# Patient Record
Sex: FEMALE | Race: BLACK OR AFRICAN AMERICAN | HISPANIC OR LATINO | URBAN - METROPOLITAN AREA
[De-identification: names, ages, dates, MRNs, and addresses within clinical notes are randomized per-mention and may not be internally consistent; named-entity substitution may affect disease eponyms.]

---

## 2018-04-12 ENCOUNTER — APPOINTMENT (RX ONLY)
Dept: URBAN - METROPOLITAN AREA CLINIC 23 | Facility: CLINIC | Age: 33
Setting detail: DERMATOLOGY
End: 2018-04-12

## 2018-04-12 DIAGNOSIS — L81.1 CHLOASMA: ICD-10-CM

## 2018-04-12 DIAGNOSIS — L70.0 ACNE VULGARIS: ICD-10-CM

## 2018-04-12 PROCEDURE — ? ADDITIONAL NOTES

## 2018-04-12 PROCEDURE — 99213 OFFICE O/P EST LOW 20 MIN: CPT

## 2018-04-12 PROCEDURE — ? RECOMMENDATIONS

## 2018-04-12 PROCEDURE — ? IN-HOUSE DISPENSING PHARMACY

## 2018-04-12 PROCEDURE — ? COUNSELING

## 2018-04-12 PROCEDURE — ? PRESCRIPTION

## 2018-04-12 RX ORDER — CLINDAMYCIN PHOSPHATE 1 %
GEL (GRAM) TOPICAL QD
Qty: 30 | Refills: 1 | Status: ERX | COMMUNITY
Start: 2018-04-12

## 2018-04-12 RX ADMIN — Medication 1: at 00:00

## 2018-04-12 NOTE — PROCEDURE: COUNSELING
Detail Level: Zone
Bactrim Pregnancy And Lactation Text: This medication is Pregnancy Category D and is known to cause fetal risk. It is also excreted in breast milk.
Dapsone Counseling: I discussed with the patient the risks of dapsone including but not limited to hemolytic anemia, agranulocytosis, rashes, methemoglobinemia, kidney failure, peripheral neuropathy, headaches, GI upset, and liver toxicity. Patients who start dapsone require monitoring including baseline LFTs and weekly CBCs for the first month, then every month thereafter. The patient verbalized understanding of the proper use and possible adverse effects of dapsone. All of the patient's questions and concerns were addressed.
Dapsone Pregnancy And Lactation Text: This medication is Pregnancy Category C and is not considered safe during pregnancy or breast feeding.
Topical Retinoid Pregnancy And Lactation Text: This medication is Pregnancy Category C. It is unknown if this medication is excreted in breast milk.
Tazorac Counseling:  Patient advised that medication is irritating and drying. Patient may need to apply sparingly and wash off after an hour before eventually leaving it on overnight. The patient verbalized understanding of the proper use and possible adverse effects of tazorac. All of the patient's questions and concerns were addressed.
Benzoyl Peroxide Pregnancy And Lactation Text: This medication is Pregnancy Category C. It is unknown if benzoyl peroxide is excreted in breast milk.
Erythromycin Counseling:  I discussed with the patient the risks of erythromycin including but not limited to GI upset, allergic reaction, drug rash, diarrhea, increase in liver enzymes, and yeast infections.
Topical Retinoid counseling:  Patient advised to apply a pea-sized amount only at bedtime and wait 30 minutes after washing their face before applying. If too drying, patient may add a non-comedogenic moisturizer. The patient verbalized understanding of the proper use and possible adverse effects of retinoids. All of the patient's questions and concerns were addressed.
Doxycycline Counseling:  Patient counseled regarding possible photosensitivity and increased risk for sunburn. Patient instructed to avoid sunlight, if possible. When exposed to sunlight, patients should wear protective clothing, sunglasses, and sunscreen. The patient was instructed to call the office immediately if the following severe adverse effects occur:  hearing changes, easy bruising/bleeding, severe headache, or vision changes. The patient verbalized understanding of the proper use and possible adverse effects of doxycycline. All of the patient's questions and concerns were addressed.
Isotretinoin Counseling: Patient should get monthly blood tests, not donate blood, not drive at night if vision affected, not share medication, and not undergo elective surgery for 6 months after tx completed. Side effects reviewed, pt to contact office should one occur.
Doxycycline Pregnancy And Lactation Text: This medication is Pregnancy Category D and not consider safe during pregnancy. It is also excreted in breast milk but is considered safe for shorter treatment courses.
Topical Sulfur Applications Counseling: Topical Sulfur Counseling: Patient counseled that this medication may cause skin irritation or allergic reactions. In the event of skin irritation, the patient was advised to reduce the amount of the drug applied or use it less frequently. The patient verbalized understanding of the proper use and possible adverse effects of topical sulfur application. All of the patient's questions and concerns were addressed.
Spironolactone Counseling: Patient advised regarding risks of diarrhea, abdominal pain, hyperkalemia, birth defects (for female patients), liver toxicity and renal toxicity. The patient may need blood work to monitor liver and kidney function and potassium levels while on therapy. The patient verbalized understanding of the proper use and possible adverse effects of spironolactone. All of the patient's questions and concerns were addressed.
Minocycline Counseling: Patient advised regarding possible photosensitivity and discoloration of the teeth, skin, lips, tongue and gums. Patient instructed to avoid sunlight, if possible. When exposed to sunlight, patients should wear protective clothing, sunglasses, and sunscreen. The patient was instructed to call the office immediately if the following severe adverse effects occur:  hearing changes, easy bruising/bleeding, severe headache, or vision changes. The patient verbalized understanding of the proper use and possible adverse effects of minocycline. All of the patient's questions and concerns were addressed.
Birth Control Pills Pregnancy And Lactation Text: This medication should be avoided if pregnant and for the first 30 days post-partum.
Tazorac Pregnancy And Lactation Text: This medication is not safe during pregnancy. It is unknown if this medication is excreted in breast milk.
Bactrim Counseling:  I discussed with the patient the risks of sulfa antibiotics including but not limited to GI upset, allergic reaction, drug rash, diarrhea, dizziness, photosensitivity, and yeast infections. Rarely, more serious reactions can occur including but not limited to aplastic anemia, agranulocytosis, methemoglobinemia, blood dyscrasias, liver or kidney failure, lung infiltrates or desquamative/blistering drug rashes.
High Dose Vitamin A Pregnancy And Lactation Text: High dose vitamin A therapy is contraindicated during pregnancy and breast feeding.
Minocycline Pregnancy And Lactation Text: This medication is Pregnancy Category D and not consider safe during pregnancy. It is also excreted in breast milk.
Birth Control Pills Counseling: Birth Control Pill Counseling: I discussed with the patient the potential side effects of OCPs including but not limited to increased risk of stroke, heart attack, thrombophlebitis, deep venous thrombosis, hepatic adenomas, breast changes, GI upset, headaches, and depression. The patient verbalized understanding of the proper use and possible adverse effects of OCPs. All of the patient's questions and concerns were addressed.
Isotretinoin Pregnancy And Lactation Text: This medication is Pregnancy Category X and is considered extremely dangerous during pregnancy. It is unknown if it is excreted in breast milk.
Erythromycin Pregnancy And Lactation Text: This medication is Pregnancy Category B and is considered safe during pregnancy. It is also excreted in breast milk.
Spironolactone Pregnancy And Lactation Text: This medication can cause feminization of the male fetus and should be avoided during pregnancy. The active metabolite is also found in breast milk.
Azithromycin Pregnancy And Lactation Text: This medication is considered safe during pregnancy and is also secreted in breast milk.
Benzoyl Peroxide Counseling: Patient counseled that medicine may cause skin irritation and bleach clothing. In the event of skin irritation, the patient was advised to reduce the amount of the drug applied or use it less frequently. The patient verbalized understanding of the proper use and possible adverse effects of benzoyl peroxide. All of the patient's questions and concerns were addressed.
Topical Clindamycin Counseling: Patient counseled that this medication may cause skin irritation or allergic reactions. In the event of skin irritation, the patient was advised to reduce the amount of the drug applied or use it less frequently. The patient verbalized understanding of the proper use and possible adverse effects of clindamycin. All of the patient's questions and concerns were addressed.
Tetracycline Counseling: Patient counseled regarding possible photosensitivity and increased risk for sunburn. Patient instructed to avoid sunlight, if possible. When exposed to sunlight, patients should wear protective clothing, sunglasses, and sunscreen. The patient was instructed to call the office immediately if the following severe adverse effects occur:  hearing changes, easy bruising/bleeding, severe headache, or vision changes. The patient verbalized understanding of the proper use and possible adverse effects of tetracycline. All of the patient's questions and concerns were addressed. Patient understands to avoid pregnancy while on therapy due to potential birth defects.
Use Enhanced Medication Counseling?: No
Topical Clindamycin Pregnancy And Lactation Text: This medication is Pregnancy Category B and is considered safe during pregnancy. It is unknown if it is excreted in breast milk.
High Dose Vitamin A Counseling: Side effects reviewed, pt to contact office should one occur.
Topical Sulfur Applications Pregnancy And Lactation Text: This medication is Pregnancy Category C and has an unknown safety profile during pregnancy. It is unknown if this topical medication is excreted in breast milk.
Azithromycin Counseling:  I discussed with the patient the risks of azithromycin including but not limited to GI upset, allergic reaction, drug rash, diarrhea, and yeast infections.

## 2018-04-12 NOTE — PROCEDURE: IN-HOUSE DISPENSING PHARMACY
Product 53 Price/Unit (In Dollars): 0
Product 71 Unit Type: mg
Name Of Product 2: Trilumma cream
Product 1 Application Directions: BID
Product 2 Unit Type: grams
Product 2 Amount/Unit (Numbers Only): [address] [address]
Product 1 Price/Unit (In Dollars): 0.00
Product 1 Unit Type: jar(s)
Product 2 Application Directions: QHS
Render Refills If Set To 0: Yes
Name Of Product 1: Brightening pads with 6% HQ
Product 1 Amount/Unit (Numbers Only): [address] [facility]
Detail Level: Zone
Product 2 Units Dispensed: 1

## 2018-04-12 NOTE — PROCEDURE: RECOMMENDATIONS
Detail Level: Zone
Recommendations (Free Text): Vitalize peel, pt quoted$175/peel; Ematrix, pt quoted $1,500 (3tx)
Recommendation Preamble: Recommended

## 2018-04-30 ENCOUNTER — APPOINTMENT (RX ONLY)
Dept: URBAN - METROPOLITAN AREA CLINIC 23 | Facility: CLINIC | Age: 33
Setting detail: DERMATOLOGY
End: 2018-04-30

## 2018-04-30 DIAGNOSIS — Z41.9 ENCOUNTER FOR PROCEDURE FOR PURPOSES OTHER THAN REMEDYING HEALTH STATE, UNSPECIFIED: ICD-10-CM

## 2018-04-30 PROCEDURE — ? CHEMICAL PEEL

## 2018-04-30 NOTE — PROCEDURE: CHEMICAL PEEL
Price (Use Numbers Only, No Special Characters Or $): 0.00
Detail Level: Zone
Prep: The treated area was degreased with pre-peel cleanser, and vaseline was applied for protection of mucous membranes.
Post-Care Instructions: I reviewed with the patient in detail post-care instructions. Patient should avoid sun exposure and wear sun protection.
Comments: Vitalize peel Lot [id number] 1/2020
Chemical Peel: Skin Medica Vitalize
Consent: Prior to the procedure, written consent was obtained and risks were reviewed, including but not limited to: redness, peeling, blistering, pigmentary change, scarring, infection, and pain.
Post Peel Care: After the procedure, a post-peel cream was applied to the treated areas. Sun protection and post-care instructions were reviewed with the patient.
Treatment Number: 1
Number Of Layers: 3

## 2018-04-30 NOTE — HPI: COSMETIC (CHEMICAL PEEL)
Have You Had A Chemical Peel Before?: has not had a previous peel
Additional History: Denies any Hx of cold sores

## 2018-07-12 ENCOUNTER — APPOINTMENT (RX ONLY)
Dept: URBAN - METROPOLITAN AREA CLINIC 23 | Facility: CLINIC | Age: 33
Setting detail: DERMATOLOGY
End: 2018-07-12

## 2018-07-12 DIAGNOSIS — Z41.9 ENCOUNTER FOR PROCEDURE FOR PURPOSES OTHER THAN REMEDYING HEALTH STATE, UNSPECIFIED: ICD-10-CM

## 2018-07-12 PROCEDURE — ? CHEMICAL PEEL

## 2018-07-12 ASSESSMENT — LOCATION DETAILED DESCRIPTION DERM: LOCATION DETAILED: LEFT INFERIOR CENTRAL MALAR CHEEK

## 2018-07-12 ASSESSMENT — LOCATION ZONE DERM: LOCATION ZONE: FACE

## 2018-07-12 ASSESSMENT — LOCATION SIMPLE DESCRIPTION DERM: LOCATION SIMPLE: LEFT CHEEK

## 2018-07-12 NOTE — PROCEDURE: CHEMICAL PEEL
Number Of Layers: 3
Post Peel Care: After the procedure, a post-peel cream was applied to the treated areas. Sun protection and post-care instructions were reviewed with the patient.
Chemical Peel: Vi
Comments: 5 passes of retinoic acid performed\\nPeel lot# [id number] exp 3/20
Consent: Prior to the procedure, written consent was obtained and risks were reviewed, including but not limited to: redness, peeling, blistering, pigmentary change, scarring, infection, and pain.
Booster Applied In Office?: yes
Treatment Number: 2
Detail Level: Zone
Prep: The treated area was degreased with pre-peel cleanser, and vaseline was applied for protection of mucous membranes.
Post-Care Instructions: I reviewed with the patient in detail post-care instructions. Patient should avoid sun exposure and wear sun protection.

## 2020-08-14 ENCOUNTER — APPOINTMENT (RX ONLY)
Dept: URBAN - METROPOLITAN AREA CLINIC 23 | Facility: CLINIC | Age: 35
Setting detail: DERMATOLOGY
End: 2020-08-14

## 2020-08-14 VITALS — TEMPERATURE: 97.7 F

## 2020-08-14 DIAGNOSIS — L70.0 ACNE VULGARIS: ICD-10-CM

## 2020-08-14 DIAGNOSIS — L81.1 CHLOASMA: ICD-10-CM

## 2020-08-14 PROCEDURE — ? COUNSELING

## 2020-08-14 PROCEDURE — 99213 OFFICE O/P EST LOW 20 MIN: CPT

## 2020-08-14 PROCEDURE — ? RECOMMENDATIONS

## 2020-08-14 PROCEDURE — ? IN-HOUSE DISPENSING PHARMACY

## 2020-08-14 PROCEDURE — ? PRESCRIPTION

## 2020-08-14 RX ORDER — TRETINOIN 0.5 MG/G
LOTION TOPICAL
Qty: 1 | Refills: 3 | Status: ERX | COMMUNITY
Start: 2020-08-14

## 2020-08-14 RX ADMIN — TRETINOIN 1: 0.5 LOTION TOPICAL at 00:00

## 2020-08-14 ASSESSMENT — LOCATION ZONE DERM: LOCATION ZONE: FACE

## 2020-08-14 ASSESSMENT — LOCATION DETAILED DESCRIPTION DERM: LOCATION DETAILED: RIGHT CENTRAL MALAR CHEEK

## 2020-08-14 ASSESSMENT — LOCATION SIMPLE DESCRIPTION DERM: LOCATION SIMPLE: RIGHT CHEEK

## 2020-08-14 NOTE — PROCEDURE: IN-HOUSE DISPENSING PHARMACY
Product 48 Price/Unit (In Dollars): 0
Name Of Product 15: Viviscal PRO
Product 30 Unit Type: mg
Product 9 Application Directions: Apply to affected area face am only as indicated.
Detail Level: Detailed
Product 13 Amount/Unit (Numbers Only): 1
Product 1 Amount/Unit (Numbers Only): 5
Product 6 Application Directions: Apply pea size amount to entire face at bedtime only.
Product 3 Unit Type: tablets
Name Of Product 17: Kelly Mishra
Product 9 Unit Type: jar(s)
Product 12 Application Directions: Apply to the entire face in the Am and Pm
Name Of Product 4: Prednisone 20mg
Product 6 Unit Type: grams
Product 19 Unit Type: tube(s)
Product 15 Application Directions: Take one tablet twice daily for 3-6 months
Name Of Product 10: Valium
Product 12 Unit Type: bottle(s)
Name Of Product 7: Latisse 5ml
Name Of Product 20: Apply pea size amount to entire face at bedtime only at bedtime only.
Product 11 Application Directions: Apply to the face once at night.
Product 8 Application Directions: Take one tablet today one hour prior to procedure as indicated.
Name Of Product 3: Prednisone 10 mg
Product 14 Application Directions: Take one tablet in the Am and Pm x 3 days
Product 21 Unit Type: kit(s)
Name Of Product 6: Tretinoin 0.05% cream
Product 13 Refills: 2
Name Of Product 9: Brightening pads 8%
Name Of Product 12: Skin Better Even tone Serum
Product 3 Application Directions: Take 1 tablet-today and one tablets tomorrow as indicated for swelling.
Product 19 Application Directions: Tretinoin 0.05%
Product 1 Price/Unit (In Dollars): 0.00
Name Of Product 21: Defenage skincare
Name Of Product 8: Valium 5 mg
Name Of Product 11: Hydroquinone 6% pads
Product 2 Application Directions: Apply thin layer at bedtime only as indicated.
Product 6 Amount/Unit (Numbers Only): [address] [facility], [place]
Product 1 Unit Type: ml
Send Charges To Patient Encounter: No
Name Of Product 14: Valtrex 500mg
Product 5 Application Directions: Take one tablet by mouth tomorrow as indicated for swelling
Product 15 Amount/Unit (Numbers Only): 26[address] [facility]
Product 21 Application Directions: Use as directed
Product 17 Application Directions: Apply thin layer to face at bedtime only.
Name Of Product 13: Exfoliate Glycolic Cleanser
Name Of Product 2: Brandon Wolf
Product 4 Application Directions: Take 1 tablet today in office post injections  for swelling as indicated and one tablet tomorrow as needed for swelling.
Product 14 Amount/Unit (Numbers Only): [address]
Product 10 Application Directions: Take two tablets with water prior to procedure.
Product 7 Application Directions: Apply to each eye lashes at bedtime
Product 1 Application Directions: Apply to eyelids at bedtime as indicated.
Name Of Product 1: Latiesse 5 ml
Product 13 Application Directions: Wash the face in the AM and PM

## 2020-08-14 NOTE — PROCEDURE: COUNSELING
Detail Level: Simple
Sarecycline Pregnancy And Lactation Text: This medication is Pregnancy Category D and not consider safe during pregnancy. It is also excreted in breast milk.
Bactrim Pregnancy And Lactation Text: This medication is Pregnancy Category D and is known to cause fetal risk. It is also excreted in breast milk.
Benzoyl Peroxide Pregnancy And Lactation Text: This medication is Pregnancy Category C. It is unknown if benzoyl peroxide is excreted in breast milk.
Birth Control Pills Pregnancy And Lactation Text: This medication should be avoided if pregnant and for the first 30 days post-partum.
High Dose Vitamin A Pregnancy And Lactation Text: High dose vitamin A therapy is contraindicated during pregnancy and breast feeding.
Dapsone Pregnancy And Lactation Text: This medication is Pregnancy Category C and is not considered safe during pregnancy or breast feeding.
Spironolactone Pregnancy And Lactation Text: This medication can cause feminization of the male fetus and should be avoided during pregnancy. The active metabolite is also found in breast milk.
Tazorac Pregnancy And Lactation Text: This medication is not safe during pregnancy. It is unknown if this medication is excreted in breast milk.
Topical Retinoid Pregnancy And Lactation Text: This medication is Pregnancy Category C. It is unknown if this medication is excreted in breast milk.
Azithromycin Pregnancy And Lactation Text: This medication is considered safe during pregnancy and is also secreted in breast milk.
Isotretinoin Pregnancy And Lactation Text: This medication is Pregnancy Category X and is considered extremely dangerous during pregnancy. It is unknown if it is excreted in breast milk.
Topical Retinoid counseling:  Patient advised to apply a pea-sized amount only at bedtime and wait 30 minutes after washing their face before applying. If too drying, patient may add a non-comedogenic moisturizer. The patient verbalized understanding of the proper use and possible adverse effects of retinoids. All of the patient's questions and concerns were addressed.
Doxycycline Counseling:  Patient counseled regarding possible photosensitivity and increased risk for sunburn. Patient instructed to avoid sunlight, if possible. When exposed to sunlight, patients should wear protective clothing, sunglasses, and sunscreen. The patient was instructed to call the office immediately if the following severe adverse effects occur:  hearing changes, easy bruising/bleeding, severe headache, or vision changes. The patient verbalized understanding of the proper use and possible adverse effects of doxycycline. All of the patient's questions and concerns were addressed.
Erythromycin Counseling:  I discussed with the patient the risks of erythromycin including but not limited to GI upset, allergic reaction, drug rash, diarrhea, increase in liver enzymes, and yeast infections.
Include Pregnancy/Lactation Warning?: No
Topical Sulfur Applications Counseling: Topical Sulfur Counseling: Patient counseled that this medication may cause skin irritation or allergic reactions. In the event of skin irritation, the patient was advised to reduce the amount of the drug applied or use it less frequently. The patient verbalized understanding of the proper use and possible adverse effects of topical sulfur application. All of the patient's questions and concerns were addressed.
Isotretinoin Counseling: Patient should get monthly blood tests, not donate blood, not drive at night if vision affected, not share medication, and not undergo elective surgery for 6 months after tx completed. Side effects reviewed, pt to contact office should one occur.
Bactrim Counseling:  I discussed with the patient the risks of sulfa antibiotics including but not limited to GI upset, allergic reaction, drug rash, diarrhea, dizziness, photosensitivity, and yeast infections. Rarely, more serious reactions can occur including but not limited to aplastic anemia, agranulocytosis, methemoglobinemia, blood dyscrasias, liver or kidney failure, lung infiltrates or desquamative/blistering drug rashes.
Birth Control Pills Counseling: Birth Control Pill Counseling: I discussed with the patient the potential side effects of OCPs including but not limited to increased risk of stroke, heart attack, thrombophlebitis, deep venous thrombosis, hepatic adenomas, breast changes, GI upset, headaches, and depression. The patient verbalized understanding of the proper use and possible adverse effects of OCPs. All of the patient's questions and concerns were addressed.
Sarecycline Counseling: Patient advised regarding possible photosensitivity and discoloration of the teeth, skin, lips, tongue and gums. Patient instructed to avoid sunlight, if possible. When exposed to sunlight, patients should wear protective clothing, sunglasses, and sunscreen. The patient was instructed to call the office immediately if the following severe adverse effects occur:  hearing changes, easy bruising/bleeding, severe headache, or vision changes. The patient verbalized understanding of the proper use and possible adverse effects of sarecycline. All of the patient's questions and concerns were addressed.
Minocycline Counseling: Patient advised regarding possible photosensitivity and discoloration of the teeth, skin, lips, tongue and gums. Patient instructed to avoid sunlight, if possible. When exposed to sunlight, patients should wear protective clothing, sunglasses, and sunscreen. The patient was instructed to call the office immediately if the following severe adverse effects occur:  hearing changes, easy bruising/bleeding, severe headache, or vision changes. The patient verbalized understanding of the proper use and possible adverse effects of minocycline. All of the patient's questions and concerns were addressed.
Benzoyl Peroxide Counseling: Patient counseled that medicine may cause skin irritation and bleach clothing. In the event of skin irritation, the patient was advised to reduce the amount of the drug applied or use it less frequently. The patient verbalized understanding of the proper use and possible adverse effects of benzoyl peroxide. All of the patient's questions and concerns were addressed.
Topical Clindamycin Counseling: Patient counseled that this medication may cause skin irritation or allergic reactions. In the event of skin irritation, the patient was advised to reduce the amount of the drug applied or use it less frequently. The patient verbalized understanding of the proper use and possible adverse effects of clindamycin. All of the patient's questions and concerns were addressed.
Tetracycline Counseling: Patient counseled regarding possible photosensitivity and increased risk for sunburn. Patient instructed to avoid sunlight, if possible. When exposed to sunlight, patients should wear protective clothing, sunglasses, and sunscreen. The patient was instructed to call the office immediately if the following severe adverse effects occur:  hearing changes, easy bruising/bleeding, severe headache, or vision changes. The patient verbalized understanding of the proper use and possible adverse effects of tetracycline. All of the patient's questions and concerns were addressed. Patient understands to avoid pregnancy while on therapy due to potential birth defects.
Dapsone Counseling: I discussed with the patient the risks of dapsone including but not limited to hemolytic anemia, agranulocytosis, rashes, methemoglobinemia, kidney failure, peripheral neuropathy, headaches, GI upset, and liver toxicity. Patients who start dapsone require monitoring including baseline LFTs and weekly CBCs for the first month, then every month thereafter. The patient verbalized understanding of the proper use and possible adverse effects of dapsone. All of the patient's questions and concerns were addressed.
High Dose Vitamin A Counseling: Side effects reviewed, pt to contact office should one occur.
Spironolactone Counseling: Patient advised regarding risks of diarrhea, abdominal pain, hyperkalemia, birth defects (for female patients), liver toxicity and renal toxicity. The patient may need blood work to monitor liver and kidney function and potassium levels while on therapy. The patient verbalized understanding of the proper use and possible adverse effects of spironolactone. All of the patient's questions and concerns were addressed.
Tazorac Counseling:  Patient advised that medication is irritating and drying. Patient may need to apply sparingly and wash off after an hour before eventually leaving it on overnight. The patient verbalized understanding of the proper use and possible adverse effects of tazorac. All of the patient's questions and concerns were addressed.
Azithromycin Counseling:  I discussed with the patient the risks of azithromycin including but not limited to GI upset, allergic reaction, drug rash, diarrhea, and yeast infections.
Topical Sulfur Applications Pregnancy And Lactation Text: This medication is Pregnancy Category C and has an unknown safety profile during pregnancy. It is unknown if this topical medication is excreted in breast milk.
Doxycycline Pregnancy And Lactation Text: This medication is Pregnancy Category D and not consider safe during pregnancy. It is also excreted in breast milk but is considered safe for shorter treatment courses.
Topical Clindamycin Pregnancy And Lactation Text: This medication is Pregnancy Category B and is considered safe during pregnancy. It is unknown if it is excreted in breast milk.
Erythromycin Pregnancy And Lactation Text: This medication is Pregnancy Category B and is considered safe during pregnancy. It is also excreted in breast milk.

## 2020-08-14 NOTE — PROCEDURE: RECOMMENDATIONS
Recommendation Preamble: The following recommendations were made during the visit: patient to monitor for any abnormalities and to follow up in 2 months
Recommendations (Free Text): Hydroquinone 8% pads in AM daily \\nAltreno Lotion at bedtime daily \\nTNS advanced + serum in AM & PM \\nPicoway Laser $350 spots or $700 full face\\nRevision teamine eye cream
Detail Level: Detailed

## 2020-10-16 ENCOUNTER — APPOINTMENT (RX ONLY)
Dept: URBAN - METROPOLITAN AREA CLINIC 23 | Facility: CLINIC | Age: 35
Setting detail: DERMATOLOGY
End: 2020-10-16

## 2020-10-16 VITALS — TEMPERATURE: 97.3 F

## 2020-10-16 DIAGNOSIS — L81.1 CHLOASMA: ICD-10-CM

## 2020-10-16 PROCEDURE — ? COUNSELING

## 2020-10-16 PROCEDURE — ? RECOMMENDATIONS

## 2020-10-16 PROCEDURE — 99213 OFFICE O/P EST LOW 20 MIN: CPT

## 2020-10-16 NOTE — PROCEDURE: RECOMMENDATIONS
Detail Level: Detailed
Recommendations (Free Text): Discussed Hydrofacials, E-matrix x 3 treatments- Quoted $1,500 vs. Picoway 3 treatments, start C & E Ferrulic, finish brightening pads, continue Altreno.
Recommendation Preamble: The following recommendations were made during the visit: patient to monitor for any abnormalities and to follow up in 2 months

## 2020-10-16 NOTE — HPI: DISCOLORATION (MELASMA)
How Severe Are They?: moderate
Is This A New Presentation, Or A Follow-Up?: Follow Up Melasma
Additional History: Patient on brightening pads 8% TNS advanced serum and altreno

## 2021-05-14 ENCOUNTER — APPOINTMENT (RX ONLY)
Dept: URBAN - METROPOLITAN AREA CLINIC 23 | Facility: CLINIC | Age: 36
Setting detail: DERMATOLOGY
End: 2021-05-14

## 2021-05-14 VITALS — TEMPERATURE: 97.5 F

## 2021-05-14 DIAGNOSIS — D22 MELANOCYTIC NEVI: ICD-10-CM

## 2021-05-14 DIAGNOSIS — L81.1 CHLOASMA: ICD-10-CM

## 2021-05-14 DIAGNOSIS — Z41.9 ENCOUNTER FOR PROCEDURE FOR PURPOSES OTHER THAN REMEDYING HEALTH STATE, UNSPECIFIED: ICD-10-CM

## 2021-05-14 DIAGNOSIS — L70.0 ACNE VULGARIS: ICD-10-CM

## 2021-05-14 PROBLEM — D22.5 MELANOCYTIC NEVI OF TRUNK: Status: ACTIVE | Noted: 2021-05-14

## 2021-05-14 PROCEDURE — 99213 OFFICE O/P EST LOW 20 MIN: CPT

## 2021-05-14 PROCEDURE — ? IN-HOUSE DISPENSING PHARMACY

## 2021-05-14 PROCEDURE — ? RECOMMENDATIONS

## 2021-05-14 PROCEDURE — ? COUNSELING

## 2021-05-14 ASSESSMENT — LOCATION SIMPLE DESCRIPTION DERM
LOCATION SIMPLE: CHEST
LOCATION SIMPLE: LEFT UPPER BACK

## 2021-05-14 ASSESSMENT — LOCATION DETAILED DESCRIPTION DERM
LOCATION DETAILED: UPPER STERNUM
LOCATION DETAILED: LEFT SUPERIOR MEDIAL UPPER BACK

## 2021-05-14 ASSESSMENT — LOCATION ZONE DERM: LOCATION ZONE: TRUNK

## 2021-05-14 NOTE — PROCEDURE: RECOMMENDATIONS
Render Risk Assessment In Note?: no
Recommendations (Free Text): Acne clearing toner pads
Detail Level: Simple
Recommendations (Free Text): Cyspera/brightening pads 10 %/Tri-storm cream
Recommendations (Free Text): Chemical peel/Fillers and botox\\nAm: Cyspera,CE Ferulic, Sunscreen\\nPm: brightening pads 10% and alternate with Altreno, followed by moisturizer

## 2021-05-14 NOTE — PROCEDURE: IN-HOUSE DISPENSING PHARMACY
Product 75 Refills: 0
Product 11 Application Directions: Apply to the face once at night.
Name Of Product 13: Exfoliate Glycolic Cleanser
Product 6 Refills: 1
Product 16 Unit Type: mg
Product 21 Unit Type: kit(s)
Product 7 Application Directions: Apply to each eye lashes at bedtime
Product 12 Unit Type: bottle(s)
Render Refills If Set To 0: No
Product 12 Refills: 2
Product 21 Application Directions: Use as directed
Name Of Product 11: Hydroquinone 6% pads
Product 12 Application Directions: Apply to the entire face in the Am and Pm
Name Of Product 4: Prednisone 20mg
Name Of Product 20: Apply pea size amount to entire face at bedtime only at bedtime only.
Name Of Product 15: Viviscal PRO
Name Of Product 12: Skin Better Even tone Serum
Product 1 Application Directions: Apply a pea size amount to neck area at bedtime only.
Product 13 Application Directions: Wash the face in the AM and PM
Product 19 Unit Type: tube(s)
Name Of Product 8: Valium 5 mg
Product 11 Unit Type: jar(s)
Product 6 Amount/Unit (Numbers Only): [address] [facility]
Name Of Product 17: Rogelio Berkowitz
Product 19 Application Directions: Tretinoin 0.05%
Name Of Product 1: Tiffany Aponte
Product 15 Amount/Unit (Numbers Only): [address] [facility]
Name Of Product 9: Brightening pads 10%
Product 5 Application Directions: Take one tablet by mouth before  procedure
Product 15 Application Directions: Take one tablet twice daily for 3-6 months
Product 8 Unit Type: tablets
Name Of Product 10: Bryan Quintero
Name Of Product 14: Valtrex 500mg
Name Of Product 2: prednisone 10mg
Product 8 Application Directions: Take 1 tablet prior to procedure. Dispense in office.
Product 17 Application Directions: Apply thin layer to face at bedtime only.
Name Of Product 6: Tretinoin 0.05% cream
Name Of Product 5: Valium 5mg
Name Of Product 7: Latisse 5ml
Product 9 Application Directions: Apply to affected area face pm as indicated
Detail Level: Detailed
Product 6 Unit Type: grams
Name Of Product 21: Defenage skincare
Product 2 Application Directions: Take one tablet today in office for swelling as indicated.
Product 10 Application Directions: Apply to face small amount at bedtime only as indicated
Product 14 Amount/Unit (Numbers Only): [address]
Product 1 Price/Unit (In Dollars): 0.00
Product 4 Application Directions: Take 1 tablet today in office post injections  for swelling as indicated and one tablet tomorrow as needed for swelling.
Product 6 Application Directions: Apply pea size amount to entire face at bedtime only.
Product 3 Application Directions: Take 1 tablet For swelling as indicated today in office
Product 14 Application Directions: Take one tablet prior to procedure. Continue twice daily x 3 days as indicated.

## 2021-05-14 NOTE — PROCEDURE: COUNSELING
Detail Level: Zone
Dapsone Pregnancy And Lactation Text: This medication is Pregnancy Category C and is not considered safe during pregnancy or breast feeding.
Topical Retinoid counseling:  Patient advised to apply a pea-sized amount only at bedtime and wait 30 minutes after washing their face before applying. If too drying, patient may add a non-comedogenic moisturizer. The patient verbalized understanding of the proper use and possible adverse effects of retinoids. All of the patient's questions and concerns were addressed.
Erythromycin Pregnancy And Lactation Text: This medication is Pregnancy Category B and is considered safe during pregnancy. It is also excreted in breast milk.
Azithromycin Counseling:  I discussed with the patient the risks of azithromycin including but not limited to GI upset, allergic reaction, drug rash, diarrhea, and yeast infections.
Topical Clindamycin Counseling: Patient counseled that this medication may cause skin irritation or allergic reactions. In the event of skin irritation, the patient was advised to reduce the amount of the drug applied or use it less frequently. The patient verbalized understanding of the proper use and possible adverse effects of clindamycin. All of the patient's questions and concerns were addressed.
High Dose Vitamin A Pregnancy And Lactation Text: High dose vitamin A therapy is contraindicated during pregnancy and breast feeding.
Topical Sulfur Applications Pregnancy And Lactation Text: This medication is Pregnancy Category C and has an unknown safety profile during pregnancy. It is unknown if this topical medication is excreted in breast milk.
Bactrim Pregnancy And Lactation Text: This medication is Pregnancy Category D and is known to cause fetal risk. It is also excreted in breast milk.
Tetracycline Counseling: Patient counseled regarding possible photosensitivity and increased risk for sunburn. Patient instructed to avoid sunlight, if possible. When exposed to sunlight, patients should wear protective clothing, sunglasses, and sunscreen. The patient was instructed to call the office immediately if the following severe adverse effects occur:  hearing changes, easy bruising/bleeding, severe headache, or vision changes. The patient verbalized understanding of the proper use and possible adverse effects of tetracycline. All of the patient's questions and concerns were addressed. Patient understands to avoid pregnancy while on therapy due to potential birth defects.
High Dose Vitamin A Counseling: Side effects reviewed, pt to contact office should one occur.
Sarecycline Counseling: Patient advised regarding possible photosensitivity and discoloration of the teeth, skin, lips, tongue and gums. Patient instructed to avoid sunlight, if possible. When exposed to sunlight, patients should wear protective clothing, sunglasses, and sunscreen. The patient was instructed to call the office immediately if the following severe adverse effects occur:  hearing changes, easy bruising/bleeding, severe headache, or vision changes. The patient verbalized understanding of the proper use and possible adverse effects of sarecycline. All of the patient's questions and concerns were addressed.
Topical Retinoid Pregnancy And Lactation Text: This medication is Pregnancy Category C. It is unknown if this medication is excreted in breast milk.
Isotretinoin Counseling: Patient should get monthly blood tests, not donate blood, not drive at night if vision affected, not share medication, and not undergo elective surgery for 6 months after tx completed. Side effects reviewed, pt to contact office should one occur.
Birth Control Pills Counseling: Birth Control Pill Counseling: I discussed with the patient the potential side effects of OCPs including but not limited to increased risk of stroke, heart attack, thrombophlebitis, deep venous thrombosis, hepatic adenomas, breast changes, GI upset, headaches, and depression. The patient verbalized understanding of the proper use and possible adverse effects of OCPs. All of the patient's questions and concerns were addressed.
Tetracycline Pregnancy And Lactation Text: This medication is Pregnancy Category D and not consider safe during pregnancy. It is also excreted in breast milk.
Doxycycline Counseling:  Patient counseled regarding possible photosensitivity and increased risk for sunburn. Patient instructed to avoid sunlight, if possible. When exposed to sunlight, patients should wear protective clothing, sunglasses, and sunscreen. The patient was instructed to call the office immediately if the following severe adverse effects occur:  hearing changes, easy bruising/bleeding, severe headache, or vision changes. The patient verbalized understanding of the proper use and possible adverse effects of doxycycline. All of the patient's questions and concerns were addressed.
Topical Clindamycin Pregnancy And Lactation Text: This medication is Pregnancy Category B and is considered safe during pregnancy. It is unknown if it is excreted in breast milk.
Doxycycline Pregnancy And Lactation Text: This medication is Pregnancy Category D and not consider safe during pregnancy. It is also excreted in breast milk but is considered safe for shorter treatment courses.
Tazorac Counseling:  Patient advised that medication is irritating and drying. Patient may need to apply sparingly and wash off after an hour before eventually leaving it on overnight. The patient verbalized understanding of the proper use and possible adverse effects of tazorac. All of the patient's questions and concerns were addressed.
Birth Control Pills Pregnancy And Lactation Text: This medication should be avoided if pregnant and for the first 30 days post-partum.
Isotretinoin Pregnancy And Lactation Text: This medication is Pregnancy Category X and is considered extremely dangerous during pregnancy. It is unknown if it is excreted in breast milk.
Spironolactone Counseling: Patient advised regarding risks of diarrhea, abdominal pain, hyperkalemia, birth defects (for female patients), liver toxicity and renal toxicity. The patient may need blood work to monitor liver and kidney function and potassium levels while on therapy. The patient verbalized understanding of the proper use and possible adverse effects of spironolactone. All of the patient's questions and concerns were addressed.
Benzoyl Peroxide Counseling: Patient counseled that medicine may cause skin irritation and bleach clothing. In the event of skin irritation, the patient was advised to reduce the amount of the drug applied or use it less frequently. The patient verbalized understanding of the proper use and possible adverse effects of benzoyl peroxide. All of the patient's questions and concerns were addressed.
Topical Sulfur Applications Counseling: Topical Sulfur Counseling: Patient counseled that this medication may cause skin irritation or allergic reactions. In the event of skin irritation, the patient was advised to reduce the amount of the drug applied or use it less frequently. The patient verbalized understanding of the proper use and possible adverse effects of topical sulfur application. All of the patient's questions and concerns were addressed.
Minocycline Counseling: Patient advised regarding possible photosensitivity and discoloration of the teeth, skin, lips, tongue and gums. Patient instructed to avoid sunlight, if possible. When exposed to sunlight, patients should wear protective clothing, sunglasses, and sunscreen. The patient was instructed to call the office immediately if the following severe adverse effects occur:  hearing changes, easy bruising/bleeding, severe headache, or vision changes. The patient verbalized understanding of the proper use and possible adverse effects of minocycline. All of the patient's questions and concerns were addressed.
Azithromycin Pregnancy And Lactation Text: This medication is considered safe during pregnancy and is also secreted in breast milk.
Erythromycin Counseling:  I discussed with the patient the risks of erythromycin including but not limited to GI upset, allergic reaction, drug rash, diarrhea, increase in liver enzymes, and yeast infections.
Tazorac Pregnancy And Lactation Text: This medication is not safe during pregnancy. It is unknown if this medication is excreted in breast milk.
Include Pregnancy/Lactation Warning?: No
Dapsone Counseling: I discussed with the patient the risks of dapsone including but not limited to hemolytic anemia, agranulocytosis, rashes, methemoglobinemia, kidney failure, peripheral neuropathy, headaches, GI upset, and liver toxicity. Patients who start dapsone require monitoring including baseline LFTs and weekly CBCs for the first month, then every month thereafter. The patient verbalized understanding of the proper use and possible adverse effects of dapsone. All of the patient's questions and concerns were addressed.
Benzoyl Peroxide Pregnancy And Lactation Text: This medication is Pregnancy Category C. It is unknown if benzoyl peroxide is excreted in breast milk.
Bactrim Counseling:  I discussed with the patient the risks of sulfa antibiotics including but not limited to GI upset, allergic reaction, drug rash, diarrhea, dizziness, photosensitivity, and yeast infections. Rarely, more serious reactions can occur including but not limited to aplastic anemia, agranulocytosis, methemoglobinemia, blood dyscrasias, liver or kidney failure, lung infiltrates or desquamative/blistering drug rashes.
Spironolactone Pregnancy And Lactation Text: This medication can cause feminization of the male fetus and should be avoided during pregnancy. The active metabolite is also found in breast milk.

## 2022-09-01 ENCOUNTER — APPOINTMENT (RX ONLY)
Dept: URBAN - METROPOLITAN AREA CLINIC 23 | Facility: CLINIC | Age: 37
Setting detail: DERMATOLOGY
End: 2022-09-01

## 2022-09-01 DIAGNOSIS — Z71.89 OTHER SPECIFIED COUNSELING: ICD-10-CM

## 2022-09-01 DIAGNOSIS — L81.1 CHLOASMA: ICD-10-CM

## 2022-09-01 DIAGNOSIS — L73.8 OTHER SPECIFIED FOLLICULAR DISORDERS: ICD-10-CM

## 2022-09-01 DIAGNOSIS — D22 MELANOCYTIC NEVI: ICD-10-CM

## 2022-09-01 DIAGNOSIS — L70.0 ACNE VULGARIS: ICD-10-CM

## 2022-09-01 PROBLEM — D23.61 OTHER BENIGN NEOPLASM OF SKIN OF RIGHT UPPER LIMB, INCLUDING SHOULDER: Status: ACTIVE | Noted: 2022-09-01

## 2022-09-01 PROBLEM — D22.5 MELANOCYTIC NEVI OF TRUNK: Status: ACTIVE | Noted: 2022-09-01

## 2022-09-01 PROCEDURE — ? SUNSCREEN RECOMMENDATIONS

## 2022-09-01 PROCEDURE — 11900 INJECT SKIN LESIONS </W 7: CPT | Mod: 59

## 2022-09-01 PROCEDURE — 99213 OFFICE O/P EST LOW 20 MIN: CPT | Mod: 25

## 2022-09-01 PROCEDURE — ? RECOMMENDATIONS

## 2022-09-01 PROCEDURE — 17110 DESTRUCTION B9 LES UP TO 14: CPT

## 2022-09-01 PROCEDURE — ? IN-HOUSE DISPENSING PHARMACY

## 2022-09-01 PROCEDURE — ? ELECTRODESICCATION

## 2022-09-01 PROCEDURE — ? INTRALESIONAL KENALOG

## 2022-09-01 PROCEDURE — ? COUNSELING

## 2022-09-01 ASSESSMENT — LOCATION SIMPLE DESCRIPTION DERM
LOCATION SIMPLE: LEFT FOREHEAD
LOCATION SIMPLE: LEFT UPPER BACK
LOCATION SIMPLE: LEFT CHEEK
LOCATION SIMPLE: INFERIOR FOREHEAD
LOCATION SIMPLE: RIGHT FOREHEAD

## 2022-09-01 ASSESSMENT — LOCATION DETAILED DESCRIPTION DERM
LOCATION DETAILED: LEFT INFERIOR CENTRAL MALAR CHEEK
LOCATION DETAILED: LEFT MEDIAL FOREHEAD
LOCATION DETAILED: RIGHT INFERIOR FOREHEAD
LOCATION DETAILED: LEFT MID-UPPER BACK
LOCATION DETAILED: LEFT SUPERIOR MEDIAL UPPER BACK
LOCATION DETAILED: RIGHT FOREHEAD
LOCATION DETAILED: INFERIOR MID FOREHEAD
LOCATION DETAILED: RIGHT INFERIOR LATERAL FOREHEAD
LOCATION DETAILED: LEFT INFERIOR LATERAL FOREHEAD

## 2022-09-01 ASSESSMENT — LOCATION ZONE DERM
LOCATION ZONE: TRUNK
LOCATION ZONE: FACE

## 2022-09-01 NOTE — PROCEDURE: ELECTRODESICCATION
Anesthesia Type: 1% lidocaine with epinephrine
Medical Necessity Information: It is in your best interest to select a reason for this procedure from the list below. All of these items fulfill various CMS LCD requirements except the new and changing color options.
Medical Necessity Clause: This procedure was medically necessary because the lesions that were treated were:
Post-Care Instructions: I reviewed with the patient in detail post-care instructions. Patient is to wear sunprotection, and avoid picking at any of the treated lesions.  Pt may apply Vaseline to crusted or scabbing areas
Detail Level: Detailed
Include Z78.9 (Other Specified Conditions Influencing Health Status) As An Associated Diagnosis?: No
Anesthesia Volume In Cc: 0.3
Consent: The patient's consent was obtained including but not limited to risks of crusting, scabbing, blistering, scarring, darker or lighter pigmentary change, recurrence, incomplete removal and infection.
Carrion: 4

## 2022-09-01 NOTE — PROCEDURE: IN-HOUSE DISPENSING PHARMACY
Product 59 Unit Type: mg
Product 46 Price/Unit (In Dollars): 0
Name Of Product 22: Calcipotriene/ 5 FLuoracil cream
Name Of Product 3: Hydroquinone brightening pads 10%
Product 4 Amount/Unit (Numbers Only): 1
Product 6 Unit Type: grams
Product 2 Refills: 3
Product 19 Unit Type: bottle(s)
Name Of Product 14: Valtrex 500mg
Name Of Product 4: Gregory Norris
Detail Level: Detailed
Product 11 Amount/Unit (Numbers Only): [address] [facility] [place]
Product 10 Application Directions: Take  1 tablet
Name Of Product 7: Clearnce Flor
Name Of Product 20: Krzysztof Moser
Product 13 Price/Unit (In Dollars): $10.00
Product 16 Application Directions: Apply to lashes at bedtime only
Send Charges To Patient Encounter: No
Product 22 Application Directions: Apply small amount to temple and forehead twice daily as indicated x 3 or 5 days
Name Of Product 12: Skin Better Even tone Serum
Product 21 Application Directions: Use as directed
Product 5 Application Directions: Apply 1 drop to both Eyelashes at bedtime only
Product 1 Unit Type: jar(s)
Name Of Product 18: Mark Levin
Product 13 Application Directions: Apply to wound site every 48 hours as indicated.
Product 15 Unit Type: ml
Name Of Product 2: Tretinoin
Product 14 Amount/Unit (Numbers Only): [address]
Product 21 Unit Type: kit(s)
Name Of Product 10: Prednisone 10mg
Product 6 Application Directions: Apply pea size amount to the face at bedtime only.
Product 2 Unit Type: tube(s)
Name Of Product 16: Latisse
Product 12 Refills: 2
Product 13 Unit Type: unit(s)
Product 2 Price/Unit (In Dollars): 75
Product 12 Application Directions: Apply to the entire face in the Am and Pm
Name Of Product 15: Ricco Rinaldi
Name Of Product 8: Prednisone 20mg
Product 11 Application Directions: Take one tablet daily as indicated x 3 days.
Name Of Product 21: [employer] skincare
Name Of Product 13: Duoderm thin
Product 17 Application Directions: Take one tablet 20 mg today in office and 10 mg tomorrow if needed for swelling. Dispense in office.
Product 4 Unit Type: tablets
Product 1 Application Directions: Apply to entire face before tretinoin at night time
Product 2 Application Directions: Apply a thin layer to face at bedtime only
Name Of Product 6: Tretinoin 0.05% cream
Name Of Product 5: Latisse 3ml
Name Of Product 31: Prednisone 20 mg
Product 9 Application Directions: Apply to affected area on the face area am as indicated.
Product 16 Amount/Unit (Numbers Only): 5
Product 15 Application Directions: Apply to affected skin as indicated
Product 8 Application Directions: Take 1 tablet after procedure
Name Of Product 19: Upneeq ophthalmic solution
Product 3 Application Directions: Apply to face am as indicated
Product 15 Amount/Unit (Numbers Only): [address] [address]
Product 7 Application Directions: Apply one drop in each eye
Product 14 Application Directions: Take one tablet prior to procedure. Continue twice daily x 3 days as indicated.
Product 20 Application Directions: Apply thin layer at bedtime to entire face
Product 4 Application Directions: Apply to face at bedtime.
Name Of Product 1: Brightening pads 10%
Product 6 Amount/Unit (Numbers Only): 20
Product 19 Application Directions: Apply once daily to each eye.
Name Of Product 9: Brightening pads 6%

## 2022-09-01 NOTE — HPI: SKIN LESION
Is This A New Presentation, Or A Follow-Up?: Skin Lesion
Additional History: Spot on right upper arm that is changing and irritating

## 2022-09-01 NOTE — PROCEDURE: INTRALESIONAL KENALOG
X Size Of Lesion In Cm (Optional): 0
Validate Note Data When Using Inventory: Yes
Consent: The risks of atrophy were reviewed with the patient.
Detail Level: Detailed
Show Inventory Tab: Show
Medical Necessity Clause: This procedure was medically necessary because the lesions that were treated were:
Concentration Of Solution Injected (Mg/Ml): 0.1
Kenalog Preparation: Kenalog in bacteriostatic water
Include Z78.9 (Other Specified Conditions Influencing Health Status) As An Associated Diagnosis?: No
Ndc# For Kenalog Only: 6653-6829-56
Which Kenalog Vial Was Used?: Kenalog 10 mg/ml (5 ml vial)
Administered By (Optional): Dr. Blaze Clark

## 2022-09-01 NOTE — PROCEDURE: RECOMMENDATIONS
Render Risk Assessment In Note?: no
Recommendations (Free Text): OTC Panoxyl  for back acne\\nTretinoin . 05% nightly, to full face \\nSkinbetter [employer] in the AM \\nRec. microneedling in the future $350.00
Detail Level: Zone
Recommendations (Free Text): Continue  Cyspera\\nRecommend [employer] Serum
Detail Level: Simple
Recommendations (Free Text): Aquaphor on healing lesions

## 2022-09-01 NOTE — PROCEDURE: COUNSELING
Bactrim Pregnancy And Lactation Text: This medication is Pregnancy Category D and is known to cause fetal risk. It is also excreted in breast milk.
Tetracycline Counseling: Patient counseled regarding possible photosensitivity and increased risk for sunburn. Patient instructed to avoid sunlight, if possible. When exposed to sunlight, patients should wear protective clothing, sunglasses, and sunscreen. The patient was instructed to call the office immediately if the following severe adverse effects occur:  hearing changes, easy bruising/bleeding, severe headache, or vision changes. The patient verbalized understanding of the proper use and possible adverse effects of tetracycline. All of the patient's questions and concerns were addressed. Patient understands to avoid pregnancy while on therapy due to potential birth defects.
Cleanser Recommendations: Clearing cleanser am and pm
Use Enhanced Medication Counseling?: No
Winlevi Pregnancy And Lactation Text: This medication is considered safe during pregnancy and breastfeeding.
Sarecycline Counseling: Patient advised regarding possible photosensitivity and discoloration of the teeth, skin, lips, tongue and gums. Patient instructed to avoid sunlight, if possible. When exposed to sunlight, patients should wear protective clothing, sunglasses, and sunscreen. The patient was instructed to call the office immediately if the following severe adverse effects occur:  hearing changes, easy bruising/bleeding, severe headache, or vision changes. The patient verbalized understanding of the proper use and possible adverse effects of sarecycline. All of the patient's questions and concerns were addressed.
Spironolactone Counseling: Patient advised regarding risks of diarrhea, abdominal pain, hyperkalemia, birth defects (for female patients), liver toxicity and renal toxicity. The patient may need blood work to monitor liver and kidney function and potassium levels while on therapy. The patient verbalized understanding of the proper use and possible adverse effects of spironolactone. All of the patient's questions and concerns were addressed.
Minocycline Counseling: Patient advised regarding possible photosensitivity and discoloration of the teeth, skin, lips, tongue and gums. Patient instructed to avoid sunlight, if possible. When exposed to sunlight, patients should wear protective clothing, sunglasses, and sunscreen. The patient was instructed to call the office immediately if the following severe adverse effects occur:  hearing changes, easy bruising/bleeding, severe headache, or vision changes. The patient verbalized understanding of the proper use and possible adverse effects of minocycline. All of the patient's questions and concerns were addressed.
Topical Sulfur Applications Pregnancy And Lactation Text: This medication is Pregnancy Category C and has an unknown safety profile during pregnancy. It is unknown if this topical medication is excreted in breast milk.
Topical Clindamycin Pregnancy And Lactation Text: This medication is Pregnancy Category B and is considered safe during pregnancy. It is unknown if it is excreted in breast milk.
Isotretinoin Counseling: Patient should get monthly blood tests, not donate blood, not drive at night if vision affected, not share medication, and not undergo elective surgery for 6 months after tx completed. Side effects reviewed, pt to contact office should one occur.
Tazorac Pregnancy And Lactation Text: This medication is not safe during pregnancy. It is unknown if this medication is excreted in breast milk.
High Dose Vitamin A Counseling: Side effects reviewed, pt to contact office should one occur.
Erythromycin Counseling:  I discussed with the patient the risks of erythromycin including but not limited to GI upset, allergic reaction, drug rash, diarrhea, increase in liver enzymes, and yeast infections.
Topical Retinoid Pregnancy And Lactation Text: This medication is Pregnancy Category C. It is unknown if this medication is excreted in breast milk.
Benzoyl Peroxide Pregnancy And Lactation Text: This medication is Pregnancy Category C. It is unknown if benzoyl peroxide is excreted in breast milk.
Doxycycline Counseling:  Patient counseled regarding possible photosensitivity and increased risk for sunburn. Patient instructed to avoid sunlight, if possible. When exposed to sunlight, patients should wear protective clothing, sunglasses, and sunscreen. The patient was instructed to call the office immediately if the following severe adverse effects occur:  hearing changes, easy bruising/bleeding, severe headache, or vision changes. The patient verbalized understanding of the proper use and possible adverse effects of doxycycline. All of the patient's questions and concerns were addressed.
Birth Control Pills Counseling: Birth Control Pill Counseling: I discussed with the patient the potential side effects of OCPs including but not limited to increased risk of stroke, heart attack, thrombophlebitis, deep venous thrombosis, hepatic adenomas, breast changes, GI upset, headaches, and depression. The patient verbalized understanding of the proper use and possible adverse effects of OCPs. All of the patient's questions and concerns were addressed.
Azelaic Acid Pregnancy And Lactation Text: This medication is considered safe during pregnancy and breast feeding.
Moisturizer Recommendations: Recommended skin better trio
Dapsone Counseling: I discussed with the patient the risks of dapsone including but not limited to hemolytic anemia, agranulocytosis, rashes, methemoglobinemia, kidney failure, peripheral neuropathy, headaches, GI upset, and liver toxicity. Patients who start dapsone require monitoring including baseline LFTs and weekly CBCs for the first month, then every month thereafter. The patient verbalized understanding of the proper use and possible adverse effects of dapsone. All of the patient's questions and concerns were addressed.
Aklief Pregnancy And Lactation Text: It is unknown if this medication is safe to use during pregnancy. It is unknown if this medication is excreted in breast milk. Breastfeeding women should use the topical cream on the smallest area of the skin for the shortest time needed while breastfeeding. Do not apply to nipple and areola.
Azithromycin Counseling:  I discussed with the patient the risks of azithromycin including but not limited to GI upset, allergic reaction, drug rash, diarrhea, and yeast infections.
Tetracycline Pregnancy And Lactation Text: This medication is Pregnancy Category D and not consider safe during pregnancy. It is also excreted in breast milk.
Bactrim Counseling:  I discussed with the patient the risks of sulfa antibiotics including but not limited to GI upset, allergic reaction, drug rash, diarrhea, dizziness, photosensitivity, and yeast infections. Rarely, more serious reactions can occur including but not limited to aplastic anemia, agranulocytosis, methemoglobinemia, blood dyscrasias, liver or kidney failure, lung infiltrates or desquamative/blistering drug rashes.
Spironolactone Pregnancy And Lactation Text: This medication can cause feminization of the male fetus and should be avoided during pregnancy. The active metabolite is also found in breast milk.
Winlevi Counseling:  I discussed with the patient the risks of topical clascoterone including but not limited to erythema, scaling, itching, and stinging. Patient voiced their understanding.
Detail Level: Simple
High Dose Vitamin A Pregnancy And Lactation Text: High dose vitamin A therapy is contraindicated during pregnancy and breast feeding.
Topical Sulfur Applications Counseling: Topical Sulfur Counseling: Patient counseled that this medication may cause skin irritation or allergic reactions. In the event of skin irritation, the patient was advised to reduce the amount of the drug applied or use it less frequently. The patient verbalized understanding of the proper use and possible adverse effects of topical sulfur application. All of the patient's questions and concerns were addressed.
Isotretinoin Pregnancy And Lactation Text: This medication is Pregnancy Category X and is considered extremely dangerous during pregnancy. It is unknown if it is excreted in breast milk.
Topical Clindamycin Counseling: Patient counseled that this medication may cause skin irritation or allergic reactions. In the event of skin irritation, the patient was advised to reduce the amount of the drug applied or use it less frequently. The patient verbalized understanding of the proper use and possible adverse effects of clindamycin. All of the patient's questions and concerns were addressed.
Tazorac Counseling:  Patient advised that medication is irritating and drying. Patient may need to apply sparingly and wash off after an hour before eventually leaving it on overnight. The patient verbalized understanding of the proper use and possible adverse effects of tazorac. All of the patient's questions and concerns were addressed.
Doxycycline Pregnancy And Lactation Text: This medication is Pregnancy Category D and not consider safe during pregnancy. It is also excreted in breast milk but is considered safe for shorter treatment courses.
Topical Retinoid counseling:  Patient advised to apply a pea-sized amount only at bedtime and wait 30 minutes after washing their face before applying. If too drying, patient may add a non-comedogenic moisturizer. The patient verbalized understanding of the proper use and possible adverse effects of retinoids. All of the patient's questions and concerns were addressed.
Erythromycin Pregnancy And Lactation Text: This medication is Pregnancy Category B and is considered safe during pregnancy. It is also excreted in breast milk.
Dapsone Pregnancy And Lactation Text: This medication is Pregnancy Category C and is not considered safe during pregnancy or breast feeding.
Topical Retinoids Recommendations: Prescribed tretinoin 0.05 % cream pea size amount to face at bedtime only
Benzoyl Peroxide Counseling: Patient counseled that medicine may cause skin irritation and bleach clothing. In the event of skin irritation, the patient was advised to reduce the amount of the drug applied or use it less frequently. The patient verbalized understanding of the proper use and possible adverse effects of benzoyl peroxide. All of the patient's questions and concerns were addressed.
Azelaic Acid Counseling: Patient counseled that medicine may cause skin irritation and to avoid applying near the eyes. In the event of skin irritation, the patient was advised to reduce the amount of the drug applied or use it less frequently. The patient verbalized understanding of the proper use and possible adverse effects of azelaic acid. All of the patient's questions and concerns were addressed.
Birth Control Pills Pregnancy And Lactation Text: This medication should be avoided if pregnant and for the first 30 days post-partum.
Azithromycin Pregnancy And Lactation Text: This medication is considered safe during pregnancy and is also secreted in breast milk.
Aklief counseling:  Patient advised to apply a pea-sized amount only at bedtime and wait 30 minutes after washing their face before applying. If too drying, patient may add a non-comedogenic moisturizer. The most commonly reported side effects including irritation, redness, scaling, dryness, stinging, burning, itching, and increased risk of sunburn. The patient verbalized understanding of the proper use and possible adverse effects of retinoids. All of the patient's questions and concerns were addressed.
Detail Level: Generalized
Detail Level: Zone
Detail Level: Detailed

## 2022-09-01 NOTE — PROCEDURE: SUNSCREEN RECOMMENDATIONS

## 2023-05-18 ENCOUNTER — APPOINTMENT (RX ONLY)
Dept: URBAN - METROPOLITAN AREA CLINIC 98 | Facility: CLINIC | Age: 38
Setting detail: DERMATOLOGY
End: 2023-05-18

## 2023-05-18 DIAGNOSIS — L21.8 OTHER SEBORRHEIC DERMATITIS: ICD-10-CM

## 2023-05-18 DIAGNOSIS — L91.8 OTHER HYPERTROPHIC DISORDERS OF THE SKIN: ICD-10-CM

## 2023-05-18 DIAGNOSIS — D18.0 HEMANGIOMA: ICD-10-CM

## 2023-05-18 DIAGNOSIS — Z71.89 OTHER SPECIFIED COUNSELING: ICD-10-CM

## 2023-05-18 DIAGNOSIS — L81.1 CHLOASMA: ICD-10-CM

## 2023-05-18 DIAGNOSIS — L65.9 NONSCARRING HAIR LOSS, UNSPECIFIED: ICD-10-CM

## 2023-05-18 DIAGNOSIS — L81.4 OTHER MELANIN HYPERPIGMENTATION: ICD-10-CM

## 2023-05-18 PROBLEM — D18.01 HEMANGIOMA OF SKIN AND SUBCUTANEOUS TISSUE: Status: ACTIVE | Noted: 2023-05-18

## 2023-05-18 PROCEDURE — ? RECOMMENDATIONS

## 2023-05-18 PROCEDURE — ? SUNSCREEN RECOMMENDATIONS

## 2023-05-18 PROCEDURE — 99213 OFFICE O/P EST LOW 20 MIN: CPT

## 2023-05-18 PROCEDURE — ? IN-HOUSE DISPENSING PHARMACY

## 2023-05-18 PROCEDURE — ? COUNSELING

## 2023-05-18 ASSESSMENT — LOCATION SIMPLE DESCRIPTION DERM
LOCATION SIMPLE: RIGHT UPPER BACK
LOCATION SIMPLE: RIGHT SCALP
LOCATION SIMPLE: RIGHT ANTERIOR NECK
LOCATION SIMPLE: RIGHT LOWER BACK

## 2023-05-18 ASSESSMENT — LOCATION DETAILED DESCRIPTION DERM
LOCATION DETAILED: RIGHT MEDIAL FRONTAL SCALP
LOCATION DETAILED: RIGHT SUPERIOR MEDIAL MIDBACK
LOCATION DETAILED: RIGHT SUPERIOR MEDIAL UPPER BACK
LOCATION DETAILED: RIGHT MEDIAL UPPER BACK
LOCATION DETAILED: RIGHT INFERIOR ANTERIOR NECK

## 2023-05-18 ASSESSMENT — LOCATION ZONE DERM
LOCATION ZONE: TRUNK
LOCATION ZONE: SCALP
LOCATION ZONE: NECK

## 2023-05-18 NOTE — PROCEDURE: IN-HOUSE DISPENSING PHARMACY
Product 54 Unit Type: mg
Product 10 Refills: 1
Product 65 Amount/Unit (Numbers Only): 0
Product 13 Unit Type: unit(s)
Product 8 Unit Type: tablets
Product 16 Amount/Unit (Numbers Only): 5
Product 1 Amount/Unit (Numbers Only): [address] [facility]
Product 6 Application Directions: Take 1 pill by mouth today
Send Charges To Patient Encounter: No
Product 1 Unit Type: grams
Product 23 Application Directions: Take by mouth.
Name Of Product 9: Brightening pads10%
Name Of Product 14: Valtrex 500mg
Product 16 Unit Type: ml
Product 7 Unit Type: kit(s)
Product 12 Unit Type: bottle(s)
Product 21 Application Directions: Apply pea size amount to entire face at bedtime only
Product 11 Amount/Unit (Numbers Only): 2
Product 26 Application Directions: Take one pill by mouth
Name Of Product 17: Prednisone 20 mg
Name Of Product 13: Duoderm thin
Product 5 Amount/Unit (Numbers Only): [address] [facility]
Product 22 Application Directions: Apply small amount to temple and forehead twice daily as indicated x 3 or 5 days
Product 15 Application Directions: Apply to affected skin as indicated
Name Of Product 2: Tretinoin 0.05%
Product 19 Application Directions: Apply thin layer at bedtime to melasma.
Name Of Product 12: Skin Better Even tone Serum
Product 21 Unit Type: tube(s)
Name Of Product 7: Dasha Bose
Product 9 Application Directions: Apply to affected area on the face area am as indicated.
Product 14 Application Directions: Take one tablet prior to procedure. Continue twice daily x 3 days as indicated.
Name Of Product 11: Prednisone 10mg
Name Of Product 22: Calcipotriene/ 5 FLuoracil cream
Name Of Product 6: Tylenol 500mg
Name Of Product 1: 5-FU
Product 13 Application Directions: Apply to wound site every 48 hours as indicated.
Product 18 Amount/Unit (Numbers Only): 20
Name Of Product 16: Latisse
Product 2 Application Directions: Mix 1 tube of Cerave moisturizer in to the jar and apply to Entire body
Product 1 Price/Unit (In Dollars): 0.00
Product 17 Application Directions: Take one tablet 20 mg today in office and 10 mg tomorrow if needed for swelling. Dispense in office.
Product 3 Unit Type: jar(s)
Product 7 Application Directions: Apply one drop in each eye
Detail Level: Detailed
Name Of Product 4: Latisse 5 ml
Product 11 Application Directions: Take one today in office and one tablet tomorrow for swelling
Product 24 Application Directions: Apply pea size amount to entire face at bedtime only.
Name Of Product 19: Gregory Norris
Product 1 Application Directions: Apply to the one spot on the cheek twice a day 3-5 days
Name Of Product 3: Hydroquinone brightening pads 10%
Product 10 Application Directions: Take  1 tablet now and one later
Name Of Product 18: Tretinoin cream 0.05%
Product 5 Application Directions: Apply a pea size amount to Entire face at bedtime
Product 4 Application Directions: Apply to Eyelids at bedtime
Name Of Product 8: Valium 5 mg
Product 15 Amount/Unit (Numbers Only): [address] [address]
Product 20 Application Directions: Apply thin layer at bedtime to entire face
Name Of Product 25: Valium 5mg
Product 13 Price/Unit (In Dollars): $10.00
Product 18 Application Directions: Mix with cerave cream and apply to body daily after shower
Product 3 Application Directions: Apply to face in pm as indicated
Name Of Product 24: Tretinoin 0.05% cream
Product 14 Amount/Unit (Numbers Only): [address]
Name Of Product 10: Prednisone 20mg tablets
Name Of Product 23: Diazepam (Valium)
Name Of Product 5: Enos Cushing
Product 8 Application Directions: Take 1 tablet before the procedure by mouth
Product 12 Application Directions: Apply to the entire face in the Am and Pm
Name Of Product 20: Krissy Dye
Name Of Product 15: Rashmi Batista
Product 16 Application Directions: Apply to lashes at bedtime only

## 2023-05-18 NOTE — PROCEDURE: RECOMMENDATIONS
Render Risk Assessment In Note?: no
Recommendations (Free Text): Start tretinoin . 38%\\XZH Ferulic in the am
Detail Level: Simple
Detail Level: Detailed
Recommendations (Free Text): Selsun blue or head and shoulders leave it on for a few minutes then rinse
Recommendations (Free Text): Nutrafol vitamins
Recommendations (Free Text): Richie Collier one treatment \\nFrax combo in the fall, pt aware price not established